# Patient Record
Sex: FEMALE | Race: WHITE | NOT HISPANIC OR LATINO | Employment: UNEMPLOYED | URBAN - METROPOLITAN AREA
[De-identification: names, ages, dates, MRNs, and addresses within clinical notes are randomized per-mention and may not be internally consistent; named-entity substitution may affect disease eponyms.]

---

## 2022-01-07 ENCOUNTER — OFFICE VISIT (OUTPATIENT)
Dept: UROLOGY | Facility: CLINIC | Age: 42
End: 2022-01-07
Payer: MEDICAID

## 2022-01-07 VITALS
BODY MASS INDEX: 40.88 KG/M2 | WEIGHT: 239.44 LBS | SYSTOLIC BLOOD PRESSURE: 128 MMHG | HEIGHT: 64 IN | DIASTOLIC BLOOD PRESSURE: 90 MMHG

## 2022-01-07 DIAGNOSIS — N39.46 MIXED INCONTINENCE: Primary | ICD-10-CM

## 2022-01-07 LAB
BILIRUB SERPL-MCNC: NORMAL MG/DL
BLOOD URINE, POC: NORMAL
CLARITY, POC UA: CLEAR
COLOR, POC UA: YELLOW
GLUCOSE UR QL STRIP: NORMAL
KETONES UR QL STRIP: NORMAL
LEUKOCYTE ESTERASE URINE, POC: NORMAL
NITRITE, POC UA: NORMAL
PH, POC UA: 5
PROTEIN, POC: NORMAL
SPECIFIC GRAVITY, POC UA: 1.01
UROBILINOGEN, POC UA: NORMAL

## 2022-01-07 PROCEDURE — 1159F PR MEDICATION LIST DOCUMENTED IN MEDICAL RECORD: ICD-10-PCS | Mod: CPTII,,, | Performed by: UROLOGY

## 2022-01-07 PROCEDURE — 81002 URINALYSIS NONAUTO W/O SCOPE: CPT | Mod: PBBFAC,PO | Performed by: UROLOGY

## 2022-01-07 PROCEDURE — 3008F PR BODY MASS INDEX (BMI) DOCUMENTED: ICD-10-PCS | Mod: CPTII,,, | Performed by: UROLOGY

## 2022-01-07 PROCEDURE — 99204 PR OFFICE/OUTPT VISIT, NEW, LEVL IV, 45-59 MIN: ICD-10-PCS | Mod: S$PBB,,, | Performed by: UROLOGY

## 2022-01-07 PROCEDURE — 3008F BODY MASS INDEX DOCD: CPT | Mod: CPTII,,, | Performed by: UROLOGY

## 2022-01-07 PROCEDURE — 1160F RVW MEDS BY RX/DR IN RCRD: CPT | Mod: CPTII,,, | Performed by: UROLOGY

## 2022-01-07 PROCEDURE — 99999 PR PBB SHADOW E&M-EST. PATIENT-LVL III: CPT | Mod: PBBFAC,,, | Performed by: UROLOGY

## 2022-01-07 PROCEDURE — 3080F PR MOST RECENT DIASTOLIC BLOOD PRESSURE >= 90 MM HG: ICD-10-PCS | Mod: CPTII,,, | Performed by: UROLOGY

## 2022-01-07 PROCEDURE — 1159F MED LIST DOCD IN RCRD: CPT | Mod: CPTII,,, | Performed by: UROLOGY

## 2022-01-07 PROCEDURE — 3074F SYST BP LT 130 MM HG: CPT | Mod: CPTII,,, | Performed by: UROLOGY

## 2022-01-07 PROCEDURE — 99213 OFFICE O/P EST LOW 20 MIN: CPT | Mod: PBBFAC,PO | Performed by: UROLOGY

## 2022-01-07 PROCEDURE — 99999 PR PBB SHADOW E&M-EST. PATIENT-LVL III: ICD-10-PCS | Mod: PBBFAC,,, | Performed by: UROLOGY

## 2022-01-07 PROCEDURE — 99204 OFFICE O/P NEW MOD 45 MIN: CPT | Mod: S$PBB,,, | Performed by: UROLOGY

## 2022-01-07 PROCEDURE — 1160F PR REVIEW ALL MEDS BY PRESCRIBER/CLIN PHARMACIST DOCUMENTED: ICD-10-PCS | Mod: CPTII,,, | Performed by: UROLOGY

## 2022-01-07 PROCEDURE — 3080F DIAST BP >= 90 MM HG: CPT | Mod: CPTII,,, | Performed by: UROLOGY

## 2022-01-07 PROCEDURE — 3074F PR MOST RECENT SYSTOLIC BLOOD PRESSURE < 130 MM HG: ICD-10-PCS | Mod: CPTII,,, | Performed by: UROLOGY

## 2022-01-07 PROCEDURE — 51798 US URINE CAPACITY MEASURE: CPT | Mod: PBBFAC,PO | Performed by: UROLOGY

## 2022-01-07 RX ORDER — OXYBUTYNIN CHLORIDE 10 MG/1
10 TABLET, EXTENDED RELEASE ORAL DAILY
Qty: 30 TABLET | Refills: 3 | Status: SHIPPED | OUTPATIENT
Start: 2022-01-07 | End: 2022-09-27 | Stop reason: ALTCHOICE

## 2022-01-07 NOTE — PATIENT INSTRUCTIONS
"Patient Education       Pelvic Muscle (Kegel) Exercises   The Basics   Written by the doctors and editors at [facility]   What are pelvic muscle exercises? -- Pelvic muscle exercises are exercises that strengthen the muscles that support the organs in the pelvis. These organs include the bladder and rectum. In the female pelvis, they also include the uterus (figure 1). The pelvic muscles are also called the "pelvic floor."  Pelvic muscle exercises are also known as "Kegel" exercises. They can help keep you from leaking urine, gas, or bowel movements, if leaks are a problem for you. They can also help with a condition called "pelvic organ prolapse." This is when the organs in the lower belly drop down and press against or bulge into the vagina.  How do I learn how to do pelvic muscle exercises? -- If you want to try pelvic muscle exercises, start by talking to your doctor or nurse. They can talk to you about whether these exercises can help you. They can also teach you how to do them correctly.  You will need to learn which muscles to tighten. It is sometimes hard to figure out the right muscles. Below are some ways you can practice:   · People with female or male anatomy - Squeeze the muscles you would use to avoid passing gas.  · People with female anatomy - Put a finger inside your vagina and squeeze the muscles around your finger. Or you can imagine that you are sitting on a marble and have to pick it up using your vagina.  · People with male anatomy - Squeeze the muscles that control the flow of urine. These exercises might help reduce urine leaks in people who have had surgery to treat prostate cancer or an enlarged prostate.  No matter how you learn to do pelvic muscle exercises, it's important to know is that the muscles involved are not in your belly, thighs, or buttocks.  After you learn which muscles to tighten, you can do the exercises in any position (standing, sitting, or lying down).  Should I see a " "physical therapist? -- Your doctor or nurse might suggest working with a physical therapist who has special training in pelvic floor issues. They can check your muscle strength and teach you specific exercises.  How often should I do the exercises? -- A common approach is to try to do a set of the exercises 3 times a day.  For each set, do the following about 10 times:  · Squeeze your pelvic muscles.  · Hold the muscles tight for about 10 seconds.   · Relax the muscles completely.  Keep up this routine for at least a few months. You will probably notice results, but it might take a few weeks to several months.   How do pelvic muscle exercises help? -- Pelvic muscle exercises can help:  · Prevent urine leaks in people who have "stress incontinence" - This means they leak urine when they cough, laugh, sneeze, or strain.  · Control sudden urges to urinate - These happen to people with "urinary urgency" or "urge incontinence."  · Control the release of gas or bowel movements  · Improve symptoms caused by pelvic organ prolapse - These can include pressure or bulging in the vagina. If you have these symptoms, it's important to see your doctor or nurse to find out the cause.  It might take a few months of doing the exercises regularly before you notice them working. If you have been doing pelvic muscle exercises for several months and they don't seem to be making a difference, tell your doctor or nurse. They might suggest seeing a physical therapist or trying other treatments for your condition.  All topics are updated as new evidence becomes available and our peer review process is complete.  This topic retrieved from [employer] on: Sep 21, 2021.  Topic 86694 Version 11.0  Release: 29.4.2 - C29.263  © 2021 UpToDate, Inc. and/or its affiliates. All rights reserved.  figure 1: Pelvic floor muscles     The "pelvic floor" is a group of muscles that support the organs in the pelvis. In women, these organs include the uterus, " bladder, and rectum.  Graphic 279375 Version 1.0    Consumer Information Use and Disclaimer   This information is not specific medical advice and does not replace information you receive from your health care provider. This is only a brief summary of general information. It does NOT include all information about conditions, illnesses, injuries, tests, procedures, treatments, therapies, discharge instructions or life-style choices that may apply to you. You must talk with your health care provider for complete information about your health and treatment options. This information should not be used to decide whether or not to accept your health care provider's advice, instructions or recommendations. Only your health care provider has the knowledge and training to provide advice that is right for you. The use of this information is governed by the [employer] End User License Agreement, available at https://[email or web address]/solutions/[email or web address]/about/henry.The use of [employer] content is governed by the [employer] Terms of Use. ©2021 UpToDate, Inc. All rights reserved.  Copyright   © 2021 UpToDate, Inc. and/or its affiliates. All rights reserved.

## 2022-01-07 NOTE — PROGRESS NOTES
"  Chief Complaint   Patient presents with    Other     New patient/ bladder leakage       Referring Provider: Dr. Barb Fleming      History of Present Illness:   Katelin Avina is a 41 y.o. female here for evaluation of Other (New patient/ bladder leakage)  22- Pt reports that she is . She reports having urinary urgency in the am, and has incontinence when she rotates to get out of bed to go to the bathroom. Also if she drinks water and tries to get up, she will leak. Has key in lock urgency/incontinence. States that her gyn a year ago wanted to do surgery, but now she is with a new gyn. She also has KAHLIL with cough/sneeze/laugh. KAHLIL=UUI. Pt reports feeling a rectal bulge and was told that she had a "rectal Hernia". New gyn didn't think it was bad enough for surgery. Wears 3 pads per day.   Hasn't tried any meds or kegels.   Oldest child is almost 2. Probably not having any more.         Review of Systems   Constitutional: Negative for chills and fever.   Respiratory: Negative for shortness of breath.    Cardiovascular: Negative for chest pain.   Gastrointestinal: Positive for constipation.   Genitourinary: Negative for dysuria and hematuria.   All other systems reviewed and are negative.      Past Medical History:   Diagnosis Date    Gestational diabetes mellitus in pregnancy        Past Surgical History:   Procedure Laterality Date    TONSILLECTOMY         Family History   Problem Relation Age of Onset    Diabetes Neg Hx     Hypertension Neg Hx        Social History     Tobacco Use    Smoking status: Never Smoker    Smokeless tobacco: Never Used   Substance Use Topics    Alcohol use: Yes     Comment: occ    Drug use: Never       Current Outpatient Medications   Medication Sig Dispense Refill    pantoprazole (PROTONIX) 40 MG tablet TAKE ONE TABLET BY MOUTH DAILY 30 MINUTES BEFORE BREAKFAST      oxybutynin (DITROPAN-XL) 10 MG 24 hr tablet Take 1 tablet (10 mg total) by mouth once daily. " 30 tablet 3     No current facility-administered medications for this visit.       Review of patient's allergies indicates:  No Known Allergies    Physical Exam  Vitals:    01/07/22 1140   BP: (!) 128/90     General: Well-developed, well-nourished, in no acute distress  HEENT: Normocephalic, atraumatic, extraocular movements intact  Neck: Supple, no supraclavicular or cervical lymphadenopathy, trachea midline  Respirations: even and unlabored  Back: midline spine, No CVA tenderness  Abdomen: soft, Non-tender, non-distended, no palpable masses, no rebound or guarding  : Normal external female genitalia without lesions. Orthotopic urethral meatus. healthy vaginal mucosa. Grade 1 Cysotcele, Grade 1 Rectocele, negative cough stress test, + urethral hypermobility  Extremities: moves all equally, no clubbing, cyanosis or edema  Skin: Warm and dry. No lesions  Psych: normal affect  Neuro: Alert and oriented x 3. Cranial nerves II-XII intact    PVR: 38cc    Urinalysis  Negative for blood, LE, nitrite    Assessment:  1. Mixed incontinence  POCT URINE DIPSTICK WITHOUT MICROSCOPE    POCT Bladder Scan         Plan:   Mixed incontinence  -     POCT URINE DIPSTICK WITHOUT MICROSCOPE  -     POCT Bladder Scan    Other orders  -     oxybutynin (DITROPAN-XL) 10 MG 24 hr tablet; Take 1 tablet (10 mg total) by mouth once daily.  Dispense: 30 tablet; Refill: 3      I had a detailed discussion with the patient today regarding management options for stress urinary incontinence. We discussed Kegel exercises, pelvic floor Physical Therapy, Mid-urethral sling, Autologous Rectus fascial sling and urethral bulking injections. Risks/benefits of each were discussed. We also discussed the bladder pathway for urge symptoms. Pt would like to start with OAB meds and  Kegels.       Follow up in about 3 months (around 4/7/2022).

## 2022-06-16 ENCOUNTER — TELEPHONE (OUTPATIENT)
Dept: INTERNAL MEDICINE | Facility: CLINIC | Age: 42
End: 2022-06-16
Payer: MEDICAID

## 2022-06-17 ENCOUNTER — OFFICE VISIT (OUTPATIENT)
Dept: PRIMARY CARE CLINIC | Facility: CLINIC | Age: 42
End: 2022-06-17
Payer: MEDICAID

## 2022-06-17 VITALS
WEIGHT: 237 LBS | DIASTOLIC BLOOD PRESSURE: 68 MMHG | SYSTOLIC BLOOD PRESSURE: 134 MMHG | HEART RATE: 81 BPM | OXYGEN SATURATION: 100 % | TEMPERATURE: 98 F | BODY MASS INDEX: 40.68 KG/M2 | RESPIRATION RATE: 18 BRPM

## 2022-06-17 DIAGNOSIS — R73.01 IMPAIRED FASTING GLUCOSE: ICD-10-CM

## 2022-06-17 DIAGNOSIS — E88.819 INSULIN RESISTANCE: Primary | Chronic | ICD-10-CM

## 2022-06-17 DIAGNOSIS — Z83.3 FAMILY HISTORY OF DIABETES MELLITUS: Chronic | ICD-10-CM

## 2022-06-17 DIAGNOSIS — E66.01 CLASS 3 SEVERE OBESITY WITH SERIOUS COMORBIDITY AND BODY MASS INDEX (BMI) OF 40.0 TO 44.9 IN ADULT, UNSPECIFIED OBESITY TYPE: ICD-10-CM

## 2022-06-17 DIAGNOSIS — Z86.32 HISTORY OF GESTATIONAL DIABETES: ICD-10-CM

## 2022-06-17 PROCEDURE — 99999 PR PBB SHADOW E&M-EST. PATIENT-LVL III: CPT | Mod: PBBFAC,,, | Performed by: FAMILY MEDICINE

## 2022-06-17 PROCEDURE — 99999 PR PBB SHADOW E&M-EST. PATIENT-LVL III: ICD-10-PCS | Mod: PBBFAC,,, | Performed by: FAMILY MEDICINE

## 2022-06-17 PROCEDURE — 3078F PR MOST RECENT DIASTOLIC BLOOD PRESSURE < 80 MM HG: ICD-10-PCS | Mod: CPTII,,, | Performed by: FAMILY MEDICINE

## 2022-06-17 PROCEDURE — 99204 OFFICE O/P NEW MOD 45 MIN: CPT | Mod: S$PBB,,, | Performed by: FAMILY MEDICINE

## 2022-06-17 PROCEDURE — 99213 OFFICE O/P EST LOW 20 MIN: CPT | Mod: PBBFAC | Performed by: FAMILY MEDICINE

## 2022-06-17 PROCEDURE — 3008F PR BODY MASS INDEX (BMI) DOCUMENTED: ICD-10-PCS | Mod: CPTII,,, | Performed by: FAMILY MEDICINE

## 2022-06-17 PROCEDURE — 99204 PR OFFICE/OUTPT VISIT, NEW, LEVL IV, 45-59 MIN: ICD-10-PCS | Mod: S$PBB,,, | Performed by: FAMILY MEDICINE

## 2022-06-17 PROCEDURE — 3075F PR MOST RECENT SYSTOLIC BLOOD PRESS GE 130-139MM HG: ICD-10-PCS | Mod: CPTII,,, | Performed by: FAMILY MEDICINE

## 2022-06-17 PROCEDURE — 1159F PR MEDICATION LIST DOCUMENTED IN MEDICAL RECORD: ICD-10-PCS | Mod: CPTII,,, | Performed by: FAMILY MEDICINE

## 2022-06-17 PROCEDURE — 1160F RVW MEDS BY RX/DR IN RCRD: CPT | Mod: CPTII,,, | Performed by: FAMILY MEDICINE

## 2022-06-17 PROCEDURE — 1159F MED LIST DOCD IN RCRD: CPT | Mod: CPTII,,, | Performed by: FAMILY MEDICINE

## 2022-06-17 PROCEDURE — 3008F BODY MASS INDEX DOCD: CPT | Mod: CPTII,,, | Performed by: FAMILY MEDICINE

## 2022-06-17 PROCEDURE — 1160F PR REVIEW ALL MEDS BY PRESCRIBER/CLIN PHARMACIST DOCUMENTED: ICD-10-PCS | Mod: CPTII,,, | Performed by: FAMILY MEDICINE

## 2022-06-17 PROCEDURE — 3075F SYST BP GE 130 - 139MM HG: CPT | Mod: CPTII,,, | Performed by: FAMILY MEDICINE

## 2022-06-17 PROCEDURE — 3078F DIAST BP <80 MM HG: CPT | Mod: CPTII,,, | Performed by: FAMILY MEDICINE

## 2022-06-17 RX ORDER — METFORMIN HYDROCHLORIDE 500 MG/1
500 TABLET, EXTENDED RELEASE ORAL 2 TIMES DAILY WITH MEALS
Qty: 60 TABLET | Refills: 3 | Status: SHIPPED | OUTPATIENT
Start: 2022-06-17 | End: 2023-09-28

## 2022-06-17 NOTE — PROGRESS NOTES
"Subjective:       Patient ID: Katelin Avina is a 41 y.o. female.  Pmhx, fam hx, soc hx, surg hx, allergies reviewed    Chief Complaint: No chief complaint on file.    Chief Complaint   Patient presents with    Weight Loss     PT PRESENTS TO CLINIC TO DISCUSS WEIGHT LOSS AND DIET PLAN.     Pt wants to discuss insulin resistance and possible weight loss  Has some brain fog/fatigue.    175 happy weight. From [place].   Tried phentermine; made snappy initially but otherwise tolerable.   Eats turkey meraz.   Sometimes eats fruit ropes.    Pt had IUD out; is aware metformin may make more fertile.   Otc supplements: consider vit D, b complex     4 year old and 2  Year old daughters. 5 kids.    Pt changed from Dr Garcia to Dr Fleming after having baby in 2020. Pt reports had hx of insulin resistance. History of gestational DM--was on am med (glyburide). No insulin. NO metformin.   A1c was 6.0 and did 2 hr glucose and had elevated 2 hour glucose. Was not able to do the study. Pt is checking fasting sugars periodically and have been 100-110.  Sugar was checked 2 hours post prandial 136. Tries low carb but states "fail on a daily basis." Sweets are a weakness; homemade products: cookies/brownies.     Eats greek yogurt/light and fit; some with flavor    uses ruben trivedi or natures own honey wheat and sandwich for lunch;turkey; occasional olive oil mcmullen  Tries to avoid chips, sliced cucumbers; salad at times with dinner  Evening meal: pasta (barilla protein pasta/chickpea) with lean ground beef or italian   Tends to eats   Raos pasta sauce.    Sometimes stays hungry; puts creamer (4 tbsp). Usually no hyperglycemia.     Was seeing Dr Vergara but changed to [facility] in [place].             Referring MD:  PCP: Dr Vergara  BMI noted 40  Diet: varied  Exercise/Activity:light  Sleep: daughter sleeps in middle; some occasional issues with sleep maintenance  Stressors: small child;  was out of work; got another " job  Anxiety/Depression Screen/PHQ-2:    HPI  Review of Systems   Constitutional: Negative for activity change, appetite change, fatigue and fever.   Eyes: Negative for visual disturbance.   Respiratory: Negative for apnea, cough, chest tightness and shortness of breath.    Cardiovascular: Negative for chest pain, palpitations and leg swelling.   Gastrointestinal: Negative for abdominal pain, constipation and diarrhea.   Endocrine: Negative for cold intolerance, heat intolerance, polydipsia, polyphagia and polyuria.   Genitourinary: Negative for frequency and menstrual problem.   Musculoskeletal: Negative for arthralgias and myalgias.   Integumentary:  Negative for color change and rash.   Psychiatric/Behavioral: Negative for sleep disturbance. The patient is not nervous/anxious.          Objective:      Physical Exam  Vitals and nursing note reviewed.   Constitutional:       General: She is not in acute distress.  HENT:      Head: Normocephalic and atraumatic.   Eyes:      General: No scleral icterus.     Pupils: Pupils are equal, round, and reactive to light.   Neck:      Comments: No TM  Cardiovascular:      Rate and Rhythm: Normal rate and regular rhythm.      Pulses: Normal pulses.      Heart sounds: Normal heart sounds. No murmur heard.    No friction rub. No gallop.   Pulmonary:      Effort: Pulmonary effort is normal. No respiratory distress.      Breath sounds: Normal breath sounds. No wheezing, rhonchi or rales.   Abdominal:      General: Bowel sounds are normal. There is no distension.      Palpations: Abdomen is soft.      Tenderness: There is no abdominal tenderness.   Musculoskeletal:         General: No swelling.      Cervical back: Normal range of motion and neck supple. No tenderness.   Lymphadenopathy:      Cervical: No cervical adenopathy.   Skin:     General: Skin is warm.      Findings: No erythema or rash.   Neurological:      Mental Status: She is alert and oriented to person, place, and time.    Psychiatric:         Mood and Affect: Mood normal.         Behavior: Behavior normal.         Assessment:                1. Impaired fasting glucose    2. Insulin resistance    3. Class 3 severe obesity with serious comorbidity and body mass index (BMI) of 40.0 to 44.9 in adult, unspecified obesity type        Plan:       Insulin resistance  Impaired fasting glucose  -     metFORMIN (GLUCOPHAGE-XR) 500 MG ER 24hr tablet; Take 1 tablet (500 mg total) by mouth 2 (two) times daily with meals.  Dispense: 60 tablet; Refill: 3    Class 3 severe obesity with serious comorbidity and body mass index (BMI) of 40.0 to 44.9 in adult, unspecified obesity type  -     metFORMIN (GLUCOPHAGE-XR) 500 MG ER 24hr tablet; Take 1 tablet (500 mg total) by mouth 2 (two) times daily with meals.  Dispense: 60 tablet; Refill: 3  D/w pt need to initiate therapy; can check sugar at home  rx metformin; utd handout given  Start with 1 pill once daily of cr; return to clinic 4 weeks; send update; likely will up titrate  If gi upset then will try to get ozempic approved; pt is still candidate for that as well  Historically tried phentermine but is not first line therapy now  Did d/w pt since had IUD out that metformin often improves insulin sens and therefore fertility--needs to have contraception if does not desire pregnancy  Goal weight loss 5-10% body weight  Pt does not want to count calories   Discussed the role of diet  Hx of elevated lipids/trip; will request labs from Dr Fleming's office--left message.      History of gestational diabetes  Family history of diabetes mellitus    > 45 min spent with patient; nutritional counseling given  Med diet; she will send a food log  Work on limiting added sugars to no more than 20 grams/day  Pt does not want to have restrictive meal plan but is willing to make some small changes

## 2022-06-17 NOTE — PATIENT INSTRUCTIONS
Please send me your food log next week.   Work on no more than 20 grams added sugar daily  I will give you the eat fit handout.  I have called out the metformin and given you an up to date handout.

## 2022-07-28 ENCOUNTER — PATIENT MESSAGE (OUTPATIENT)
Dept: PRIMARY CARE CLINIC | Facility: CLINIC | Age: 42
End: 2022-07-28
Payer: MEDICAID

## 2022-07-28 ENCOUNTER — PATIENT MESSAGE (OUTPATIENT)
Dept: UROLOGY | Facility: CLINIC | Age: 42
End: 2022-07-28
Payer: MEDICAID

## 2022-09-27 ENCOUNTER — PATIENT MESSAGE (OUTPATIENT)
Dept: PRIMARY CARE CLINIC | Facility: CLINIC | Age: 42
End: 2022-09-27

## 2022-09-27 ENCOUNTER — OFFICE VISIT (OUTPATIENT)
Dept: PRIMARY CARE CLINIC | Facility: CLINIC | Age: 42
End: 2022-09-27
Payer: MEDICAID

## 2022-09-27 DIAGNOSIS — E66.01 CLASS 3 SEVERE OBESITY WITH SERIOUS COMORBIDITY AND BODY MASS INDEX (BMI) OF 40.0 TO 44.9 IN ADULT, UNSPECIFIED OBESITY TYPE: ICD-10-CM

## 2022-09-27 DIAGNOSIS — R73.01 IMPAIRED FASTING GLUCOSE: ICD-10-CM

## 2022-09-27 DIAGNOSIS — E88.819 INSULIN RESISTANCE: ICD-10-CM

## 2022-09-27 DIAGNOSIS — Z83.3 FAMILY HISTORY OF DIABETES MELLITUS: Chronic | ICD-10-CM

## 2022-09-27 DIAGNOSIS — E88.810 METABOLIC SYNDROME: Primary | ICD-10-CM

## 2022-09-27 PROCEDURE — 1159F PR MEDICATION LIST DOCUMENTED IN MEDICAL RECORD: ICD-10-PCS | Mod: CPTII,95,, | Performed by: FAMILY MEDICINE

## 2022-09-27 PROCEDURE — 99214 PR OFFICE/OUTPT VISIT, EST, LEVL IV, 30-39 MIN: ICD-10-PCS | Mod: 95,,, | Performed by: FAMILY MEDICINE

## 2022-09-27 PROCEDURE — 99214 OFFICE O/P EST MOD 30 MIN: CPT | Mod: 95,,, | Performed by: FAMILY MEDICINE

## 2022-09-27 PROCEDURE — 1160F PR REVIEW ALL MEDS BY PRESCRIBER/CLIN PHARMACIST DOCUMENTED: ICD-10-PCS | Mod: CPTII,95,, | Performed by: FAMILY MEDICINE

## 2022-09-27 PROCEDURE — 1159F MED LIST DOCD IN RCRD: CPT | Mod: CPTII,95,, | Performed by: FAMILY MEDICINE

## 2022-09-27 PROCEDURE — 1160F RVW MEDS BY RX/DR IN RCRD: CPT | Mod: CPTII,95,, | Performed by: FAMILY MEDICINE

## 2022-09-27 RX ORDER — SEMAGLUTIDE 1.34 MG/ML
INJECTION, SOLUTION SUBCUTANEOUS
Qty: 1 PEN | Refills: 1 | Status: SHIPPED | OUTPATIENT
Start: 2022-09-27 | End: 2023-01-27

## 2022-09-27 NOTE — PROGRESS NOTES
Subjective:       Patient ID: Katelin Avina is a 42 y.o. female.  Pmhx, fam hx, soc hx, surg hx, allergies, med list reviewed     Chief Complaint: f/u insulin resistance/metformin/metabolic syndrome  The [place] is: [place]  The chief complaint leading to consultation is: as above    Visit type: audiovisual    Face to Face time with patient: 25  35 minutes of total time spent on the encounter, which includes face to face time and non-face to face time preparing to see the patient (eg, review of tests), Obtaining and/or reviewing separately obtained history, Documenting clinical information in the electronic or other health record, Independently interpreting results (not separately reported) and communicating results to the patient/family/caregiver, or Care coordination (not separately reported).         Each patient to whom he or she provides medical services by telemedicine is:  (1) informed of the relationship between the physician and patient and the respective role of any other health care provider with respect to management of the patient; and (2) notified that he or she may decline to receive medical services by telemedicine and may withdraw from such care at any time.    Notes:    Hx insulin resistance, on metformin. DW pt lifestyle changes at last visit. Meets criteria for met syndrome (waist circ, insulin resistance, trig/hdl). She reports had lost 7 lb but went to beach for labor day and gained 5 lb back. Has been trying to get back on track for the last 2-3 weeks. No new foods tried but has been incorporating more veggies and water.   Plans to start walking since weather cooled off. Is due for some labs. NO new questions. Agreeable to nutrition referral.     No cp/sob. No hx of thyroid issues/nodules. NO throat pain.     Did find out had covid on the day she had her appt her. Has recovered. Reports doing well.      HPI  Review of Systems   Constitutional:  Negative for activity change,  appetite change, fatigue and fever.   HENT:  Negative for mouth dryness and goiter.    Eyes:  Negative for visual disturbance.   Respiratory:  Negative for apnea, cough, chest tightness and shortness of breath.    Cardiovascular:  Negative for chest pain, palpitations and leg swelling.   Gastrointestinal:  Negative for abdominal pain, constipation and diarrhea.   Endocrine: Negative for cold intolerance, heat intolerance, polydipsia, polyphagia and polyuria.   Genitourinary:  Negative for frequency and menstrual problem.   Musculoskeletal:  Negative for arthralgias and myalgias.   Integumentary:  Negative for color change and rash.   Psychiatric/Behavioral:  Negative for sleep disturbance. The patient is not nervous/anxious.        Objective:      Physical Exam  Vitals and nursing note reviewed.   Constitutional:       General: She is not in acute distress.  HENT:      Head: Normocephalic and atraumatic.      Mouth/Throat:      Pharynx: Oropharynx is clear.   Eyes:      General: No scleral icterus.     Pupils: Pupils are equal, round, and reactive to light.   Neck:      Comments: No TM  Cardiovascular:      Rate and Rhythm: Normal rate and regular rhythm.      Pulses: Normal pulses.      Heart sounds: Normal heart sounds. No murmur heard.    No friction rub. No gallop.   Pulmonary:      Effort: Pulmonary effort is normal. No respiratory distress.      Breath sounds: Normal breath sounds. No wheezing, rhonchi or rales.   Abdominal:      General: Bowel sounds are normal. There is no distension.      Palpations: Abdomen is soft.      Tenderness: There is no abdominal tenderness.   Musculoskeletal:         General: No swelling.      Cervical back: Normal range of motion and neck supple. No tenderness.   Lymphadenopathy:      Cervical: No cervical adenopathy.   Skin:     General: Skin is warm.      Findings: No erythema or rash.   Neurological:      Mental Status: She is alert and oriented to person, place, and time.    Psychiatric:         Mood and Affect: Mood normal.         Behavior: Behavior normal.       Assessment:         ICD-10-CM ICD-9-CM   1. Metabolic syndrome  E88.81 277.7   2. Insulin resistance  E88.81 277.7   3. Impaired fasting glucose  R73.01 790.21   4. Class 3 severe obesity with serious comorbidity and body mass index (BMI) of 40.0 to 44.9 in adult, unspecified obesity type  E66.01 278.01    Z68.41 V85.41   5. Family history of diabetes mellitus  Z83.3 V18.0             Plan:       Metabolic syndrome  Comments:  3/5 criteria: trig/lip, waist circ (self reported >40), insulin resistance/IFG)  Orders:  -     semaglutide (OZEMPIC) 0.25 mg or 0.5 mg(2 mg/1.5 mL) pen injector; Inject 0.25 mg subq weekly x 2 weeks then 0.5 mg subq weekly  Dispense: 1 pen; Refill: 1  -     Comprehensive Metabolic Panel; Future; Expected date: 09/27/2022  -     Hemoglobin A1C; Future; Expected date: 09/27/2022  -     Insulin, Random; Future; Expected date: 09/27/2022  -     Lipid Panel; Future; Expected date: 09/27/2022  -     TSH; Future; Expected date: 09/27/2022  Referral Nutrition     Insulin resistance  -     semaglutide (OZEMPIC) 0.25 mg or 0.5 mg(2 mg/1.5 mL) pen injector; Inject 0.25 mg subq weekly x 2 weeks then 0.5 mg subq weekly  Dispense: 1 pen; Refill: 1  -     Comprehensive Metabolic Panel; Future; Expected date: 09/27/2022  -     Hemoglobin A1C; Future; Expected date: 09/27/2022  -     Insulin, Random; Future; Expected date: 09/27/2022  -     Lipid Panel; Future; Expected date: 09/27/2022  -     TSH; Future; Expected date: 09/27/2022    Impaired fasting glucose  -     Hemoglobin A1C; Future; Expected date: 09/27/2022    Class 3 severe obesity with serious comorbidity and body mass index (BMI) of 40.0 to 44.9 in adult, unspecified obesity type  -     semaglutide (OZEMPIC) 0.25 mg or 0.5 mg(2 mg/1.5 mL) pen injector; Inject 0.25 mg subq weekly x 2 weeks then 0.5 mg subq weekly  Dispense: 1 pen; Refill: 1  -      Comprehensive Metabolic Panel; Future; Expected date: 09/27/2022  -     Hemoglobin A1C; Future; Expected date: 09/27/2022  -     Insulin, Random; Future; Expected date: 09/27/2022  -     TSH; Future; Expected date: 09/27/2022  Referral nutrition    Family history of diabetes mellitus  -     Hemoglobin A1C; Future; Expected date: 09/27/2022    Continued efforts lifestyle interventions d/w pt  Some additional nutrition counseling given; return to clinic 4-6 weeks  Pt to send priya update next week; she will let me know what insurance says about glp-1 coverage  If not--is overall tolerating metformin bid can increase dosage.       Risks/benefits/common side effects of medication discussed with patient at length. UTD patient handout given. (mychart msg)  D/W pt at length potential pharmacologic options; she desires consideration of ozempic. Risks/benefits/common side effects of ozempic d/w pt including nausea and pain at injection site; she is aware should not get pregnant while taking and not approved while breastfeeding. NO personal/fam hx of MEN or medullary thyroid cancer. No hx of pancreatitis. Instructed pt how to use with demo pen; pt practiced in office. Pt advised if approved will get pt handout from pharmacy. Pt has no hx of thyroid nodules or biliary disease/gallstones. DW pt with substantial or rapid weight loss gallstones could develop. Also dw pt glp-1 MOA and common side effects.    Pt expressed undestanding; will need to use some form of contraception

## 2022-09-29 ENCOUNTER — PATIENT MESSAGE (OUTPATIENT)
Dept: PRIMARY CARE CLINIC | Facility: CLINIC | Age: 42
End: 2022-09-29
Payer: MEDICAID

## 2022-09-29 ENCOUNTER — LAB VISIT (OUTPATIENT)
Dept: LAB | Facility: HOSPITAL | Age: 42
End: 2022-09-29
Attending: FAMILY MEDICINE
Payer: MEDICAID

## 2022-09-29 DIAGNOSIS — E88.819 INSULIN RESISTANCE: ICD-10-CM

## 2022-09-29 DIAGNOSIS — Z83.3 FAMILY HISTORY OF DIABETES MELLITUS: Chronic | ICD-10-CM

## 2022-09-29 DIAGNOSIS — R73.01 IMPAIRED FASTING GLUCOSE: ICD-10-CM

## 2022-09-29 DIAGNOSIS — E66.01 CLASS 3 SEVERE OBESITY WITH SERIOUS COMORBIDITY AND BODY MASS INDEX (BMI) OF 40.0 TO 44.9 IN ADULT, UNSPECIFIED OBESITY TYPE: ICD-10-CM

## 2022-09-29 DIAGNOSIS — E88.810 METABOLIC SYNDROME: ICD-10-CM

## 2022-09-29 LAB
ALBUMIN SERPL BCP-MCNC: 4.1 G/DL (ref 3.5–5.2)
ALP SERPL-CCNC: 77 U/L (ref 55–135)
ALT SERPL W/O P-5'-P-CCNC: 35 U/L (ref 10–44)
ANION GAP SERPL CALC-SCNC: 6 MMOL/L (ref 8–16)
AST SERPL-CCNC: 26 U/L (ref 10–40)
BILIRUB SERPL-MCNC: 0.4 MG/DL (ref 0.1–1)
BUN SERPL-MCNC: 10 MG/DL (ref 6–20)
CALCIUM SERPL-MCNC: 9.8 MG/DL (ref 8.7–10.5)
CHLORIDE SERPL-SCNC: 105 MMOL/L (ref 95–110)
CHOLEST SERPL-MCNC: 158 MG/DL (ref 120–199)
CHOLEST/HDLC SERPL: 5.4 {RATIO} (ref 2–5)
CO2 SERPL-SCNC: 26 MMOL/L (ref 23–29)
CREAT SERPL-MCNC: 0.8 MG/DL (ref 0.5–1.4)
EST. GFR  (NO RACE VARIABLE): >60 ML/MIN/1.73 M^2
ESTIMATED AVG GLUCOSE: 111 MG/DL (ref 68–131)
GLUCOSE SERPL-MCNC: 111 MG/DL (ref 70–110)
HBA1C MFR BLD: 5.5 % (ref 4–5.6)
HDLC SERPL-MCNC: 29 MG/DL (ref 40–75)
HDLC SERPL: 18.4 % (ref 20–50)
INSULIN COLLECTION INTERVAL: NORMAL
INSULIN SERPL-ACNC: 14.2 UU/ML
LDLC SERPL CALC-MCNC: 87.6 MG/DL (ref 63–159)
NONHDLC SERPL-MCNC: 129 MG/DL
POTASSIUM SERPL-SCNC: 4.5 MMOL/L (ref 3.5–5.1)
PROT SERPL-MCNC: 7.2 G/DL (ref 6–8.4)
SODIUM SERPL-SCNC: 137 MMOL/L (ref 136–145)
TRIGL SERPL-MCNC: 207 MG/DL (ref 30–150)
TSH SERPL DL<=0.005 MIU/L-ACNC: 1.35 UIU/ML (ref 0.4–4)

## 2022-09-29 PROCEDURE — 84443 ASSAY THYROID STIM HORMONE: CPT | Performed by: FAMILY MEDICINE

## 2022-09-29 PROCEDURE — 80053 COMPREHEN METABOLIC PANEL: CPT | Performed by: FAMILY MEDICINE

## 2022-09-29 PROCEDURE — 36415 COLL VENOUS BLD VENIPUNCTURE: CPT | Performed by: FAMILY MEDICINE

## 2022-09-29 PROCEDURE — 80061 LIPID PANEL: CPT | Performed by: FAMILY MEDICINE

## 2022-09-29 PROCEDURE — 83036 HEMOGLOBIN GLYCOSYLATED A1C: CPT | Performed by: FAMILY MEDICINE

## 2022-09-29 PROCEDURE — 83525 ASSAY OF INSULIN: CPT | Performed by: FAMILY MEDICINE

## 2022-10-13 ENCOUNTER — PATIENT MESSAGE (OUTPATIENT)
Dept: PRIMARY CARE CLINIC | Facility: CLINIC | Age: 42
End: 2022-10-13
Payer: MEDICAID

## 2022-10-21 DIAGNOSIS — Z83.3 FAMILY HISTORY OF DIABETES MELLITUS: ICD-10-CM

## 2022-10-21 DIAGNOSIS — E66.01 CLASS 3 SEVERE OBESITY WITH SERIOUS COMORBIDITY AND BODY MASS INDEX (BMI) OF 40.0 TO 44.9 IN ADULT, UNSPECIFIED OBESITY TYPE: ICD-10-CM

## 2022-10-21 DIAGNOSIS — E88.810 METABOLIC SYNDROME: ICD-10-CM

## 2022-10-21 DIAGNOSIS — E88.819 INSULIN RESISTANCE: Primary | ICD-10-CM

## 2022-10-21 RX ORDER — SEMAGLUTIDE 1.34 MG/ML
1 INJECTION, SOLUTION SUBCUTANEOUS
Qty: 1 PEN | Refills: 2 | Status: SHIPPED | OUTPATIENT
Start: 2022-10-21 | End: 2023-01-03 | Stop reason: SDUPTHER

## 2022-11-20 ENCOUNTER — PATIENT MESSAGE (OUTPATIENT)
Dept: PRIMARY CARE CLINIC | Facility: CLINIC | Age: 42
End: 2022-11-20
Payer: MEDICAID

## 2023-01-02 ENCOUNTER — PATIENT MESSAGE (OUTPATIENT)
Dept: PRIMARY CARE CLINIC | Facility: CLINIC | Age: 43
End: 2023-01-02
Payer: MEDICAID

## 2023-01-02 DIAGNOSIS — Z83.3 FAMILY HISTORY OF DIABETES MELLITUS: ICD-10-CM

## 2023-01-02 DIAGNOSIS — E88.819 INSULIN RESISTANCE: ICD-10-CM

## 2023-01-02 DIAGNOSIS — E66.01 CLASS 3 SEVERE OBESITY WITH SERIOUS COMORBIDITY AND BODY MASS INDEX (BMI) OF 40.0 TO 44.9 IN ADULT, UNSPECIFIED OBESITY TYPE: ICD-10-CM

## 2023-01-02 DIAGNOSIS — E88.810 METABOLIC SYNDROME: ICD-10-CM

## 2023-01-03 ENCOUNTER — PATIENT MESSAGE (OUTPATIENT)
Dept: PRIMARY CARE CLINIC | Facility: CLINIC | Age: 43
End: 2023-01-03
Payer: MEDICAID

## 2023-01-03 RX ORDER — SEMAGLUTIDE 1.34 MG/ML
1 INJECTION, SOLUTION SUBCUTANEOUS
Qty: 1 PEN | Refills: 2 | Status: SHIPPED | OUTPATIENT
Start: 2023-01-03 | End: 2023-01-27

## 2023-01-26 ENCOUNTER — PATIENT MESSAGE (OUTPATIENT)
Dept: PRIMARY CARE CLINIC | Facility: CLINIC | Age: 43
End: 2023-01-26
Payer: MEDICAID

## 2023-01-26 DIAGNOSIS — E66.01 CLASS 3 SEVERE OBESITY WITH SERIOUS COMORBIDITY AND BODY MASS INDEX (BMI) OF 40.0 TO 44.9 IN ADULT, UNSPECIFIED OBESITY TYPE: ICD-10-CM

## 2023-01-26 DIAGNOSIS — E88.810 METABOLIC SYNDROME: Primary | ICD-10-CM

## 2023-01-26 DIAGNOSIS — Z83.3 FAMILY HISTORY OF DIABETES MELLITUS: ICD-10-CM

## 2023-01-26 DIAGNOSIS — E88.819 INSULIN RESISTANCE: ICD-10-CM

## 2023-01-26 DIAGNOSIS — R73.01 IMPAIRED FASTING GLUCOSE: ICD-10-CM

## 2023-01-27 ENCOUNTER — PATIENT MESSAGE (OUTPATIENT)
Dept: PRIMARY CARE CLINIC | Facility: CLINIC | Age: 43
End: 2023-01-27
Payer: MEDICAID

## 2023-02-14 ENCOUNTER — OFFICE VISIT (OUTPATIENT)
Dept: PRIMARY CARE CLINIC | Facility: CLINIC | Age: 43
End: 2023-02-14
Payer: MEDICAID

## 2023-02-14 VITALS
HEIGHT: 64 IN | DIASTOLIC BLOOD PRESSURE: 80 MMHG | HEART RATE: 67 BPM | WEIGHT: 199.31 LBS | SYSTOLIC BLOOD PRESSURE: 124 MMHG | TEMPERATURE: 99 F | OXYGEN SATURATION: 98 % | BODY MASS INDEX: 34.03 KG/M2

## 2023-02-14 DIAGNOSIS — Z83.3 FAMILY HISTORY OF DIABETES MELLITUS: Chronic | ICD-10-CM

## 2023-02-14 DIAGNOSIS — Z86.32 HISTORY OF GESTATIONAL DIABETES: ICD-10-CM

## 2023-02-14 DIAGNOSIS — E66.01 CLASS 3 SEVERE OBESITY WITH SERIOUS COMORBIDITY AND BODY MASS INDEX (BMI) OF 40.0 TO 44.9 IN ADULT, UNSPECIFIED OBESITY TYPE: ICD-10-CM

## 2023-02-14 DIAGNOSIS — E88.810 METABOLIC SYNDROME: ICD-10-CM

## 2023-02-14 DIAGNOSIS — E66.09 CLASS 1 OBESITY DUE TO EXCESS CALORIES WITH SERIOUS COMORBIDITY AND BODY MASS INDEX (BMI) OF 34.0 TO 34.9 IN ADULT: ICD-10-CM

## 2023-02-14 DIAGNOSIS — E88.819 INSULIN RESISTANCE: Primary | ICD-10-CM

## 2023-02-14 PROBLEM — E66.2 CLASS 1 OBESITY WITH ALVEOLAR HYPOVENTILATION, SERIOUS COMORBIDITY, AND BODY MASS INDEX (BMI) OF 34.0 TO 34.9 IN ADULT: Status: ACTIVE | Noted: 2022-06-17

## 2023-02-14 PROCEDURE — 3074F SYST BP LT 130 MM HG: CPT | Mod: CPTII,,, | Performed by: FAMILY MEDICINE

## 2023-02-14 PROCEDURE — 3079F DIAST BP 80-89 MM HG: CPT | Mod: CPTII,,, | Performed by: FAMILY MEDICINE

## 2023-02-14 PROCEDURE — 3008F PR BODY MASS INDEX (BMI) DOCUMENTED: ICD-10-PCS | Mod: CPTII,,, | Performed by: FAMILY MEDICINE

## 2023-02-14 PROCEDURE — 99214 OFFICE O/P EST MOD 30 MIN: CPT | Mod: S$PBB,,, | Performed by: FAMILY MEDICINE

## 2023-02-14 PROCEDURE — 3079F PR MOST RECENT DIASTOLIC BLOOD PRESSURE 80-89 MM HG: ICD-10-PCS | Mod: CPTII,,, | Performed by: FAMILY MEDICINE

## 2023-02-14 PROCEDURE — 1159F MED LIST DOCD IN RCRD: CPT | Mod: CPTII,,, | Performed by: FAMILY MEDICINE

## 2023-02-14 PROCEDURE — 3008F BODY MASS INDEX DOCD: CPT | Mod: CPTII,,, | Performed by: FAMILY MEDICINE

## 2023-02-14 PROCEDURE — 3074F PR MOST RECENT SYSTOLIC BLOOD PRESSURE < 130 MM HG: ICD-10-PCS | Mod: CPTII,,, | Performed by: FAMILY MEDICINE

## 2023-02-14 PROCEDURE — 99214 PR OFFICE/OUTPT VISIT, EST, LEVL IV, 30-39 MIN: ICD-10-PCS | Mod: S$PBB,,, | Performed by: FAMILY MEDICINE

## 2023-02-14 PROCEDURE — 1159F PR MEDICATION LIST DOCUMENTED IN MEDICAL RECORD: ICD-10-PCS | Mod: CPTII,,, | Performed by: FAMILY MEDICINE

## 2023-02-14 PROCEDURE — 99999 PR PBB SHADOW E&M-EST. PATIENT-LVL III: CPT | Mod: PBBFAC,,, | Performed by: FAMILY MEDICINE

## 2023-02-14 PROCEDURE — 1160F PR REVIEW ALL MEDS BY PRESCRIBER/CLIN PHARMACIST DOCUMENTED: ICD-10-PCS | Mod: CPTII,,, | Performed by: FAMILY MEDICINE

## 2023-02-14 PROCEDURE — 99213 OFFICE O/P EST LOW 20 MIN: CPT | Mod: PBBFAC | Performed by: FAMILY MEDICINE

## 2023-02-14 PROCEDURE — 99999 PR PBB SHADOW E&M-EST. PATIENT-LVL III: ICD-10-PCS | Mod: PBBFAC,,, | Performed by: FAMILY MEDICINE

## 2023-02-14 PROCEDURE — 1160F RVW MEDS BY RX/DR IN RCRD: CPT | Mod: CPTII,,, | Performed by: FAMILY MEDICINE

## 2023-02-14 NOTE — PROGRESS NOTES
Subjective:       Patient ID: Katelin Avina is a 42 y.o. female.  Pmhx, fam hx, soc hx, surg hx, allergies, med list reviewed     Wt Readings from Last 3 Encounters:   02/14/23 1000 90.4 kg (199 lb 4.7 oz)   01/16/23 1321 93 kg (205 lb)   06/17/22 1105 107.5 kg (236 lb 15.9 oz)      Chief Complaint: insulin resistance    Pt reports has been doing well overall. Pt saw Dr. Fleming for a possible tubal.  will now likely do vasectomy. Pt currently using condoms. She has been careful. Cycles still similar--every 40 days.     Pt has lost 36 lb.     She started the 2 mg dosage and has been tolerating. No gi upset. NO constipation. NO neck/throat pain. Is on probiotic. Uses miralax prn only. Stopped taking the metformin a month into her medication.     She has been checking sugars periodically. She is feeling significantly better--no new headaches or fogginess in the afternoon.     Initially she ate less and has (over the last few months) been more selective with meds. She has cut down on sugars.      has also lost 11 lb.     Sleep is good. She plans to walk 5-10 min/day prior to going to [place].          HPI  Review of Systems   Constitutional:  Negative for activity change, appetite change, fatigue and fever.   HENT:  Negative for mouth dryness and goiter.    Eyes:  Negative for visual disturbance.   Respiratory:  Negative for apnea, cough, chest tightness and shortness of breath.    Cardiovascular:  Negative for chest pain, palpitations and leg swelling.   Gastrointestinal:  Negative for abdominal pain, constipation and diarrhea.   Endocrine: Negative for cold intolerance, heat intolerance, polydipsia, polyphagia and polyuria.   Genitourinary:  Negative for frequency and menstrual problem.   Musculoskeletal:  Negative for arthralgias and myalgias.   Integumentary:  Negative for color change and rash.   Psychiatric/Behavioral:  Negative for sleep disturbance. The patient is not nervous/anxious.         Objective:      Physical Exam  Vitals and nursing note reviewed.   Constitutional:       General: She is not in acute distress.  HENT:      Head: Normocephalic and atraumatic.   Eyes:      General: No scleral icterus.     Pupils: Pupils are equal, round, and reactive to light.   Neck:      Comments: No TM  Cardiovascular:      Rate and Rhythm: Normal rate and regular rhythm.      Pulses: Normal pulses.      Heart sounds: Normal heart sounds. No murmur heard.    No friction rub. No gallop.   Pulmonary:      Effort: Pulmonary effort is normal. No respiratory distress.      Breath sounds: Normal breath sounds. No wheezing, rhonchi or rales.   Abdominal:      General: Bowel sounds are normal. There is no distension.      Palpations: Abdomen is soft.      Tenderness: There is no abdominal tenderness.   Musculoskeletal:         General: No swelling.      Cervical back: Normal range of motion and neck supple. No tenderness.   Lymphadenopathy:      Cervical: No cervical adenopathy.   Skin:     General: Skin is warm.      Findings: No erythema or rash.   Neurological:      Mental Status: She is alert and oriented to person, place, and time.   Psychiatric:         Mood and Affect: Mood normal.         Behavior: Behavior normal.       Assessment:         ICD-10-CM ICD-9-CM   1. Insulin resistance  E88.81 277.7   2. Family history of diabetes mellitus  Z83.3 V18.0   3. History of gestational diabetes  Z86.32 V12.21   4. Class 1 obesity due to excess calories with serious comorbidity and body mass index (BMI) of 34.0 to 34.9 in adult  E66.09 278.00    Z68.34 V85.34   5. Metabolic syndrome  E88.81 277.7   6. Class 3 severe obesity with serious comorbidity and body mass index (BMI) of 40.0 to 44.9 in adult, unspecified obesity type  E66.01 278.01    Z68.41 V85.41   7. Impaired fasting glucose  R73.01 790.21            Plan:       Insulin resistance  -     semaglutide (OZEMPIC) 2 mg/dose (8 mg/3 mL) PnIj; Inject 2 mg into the  skin every 7 days.  Dispense: 1 pen; Refill: 1    Family history of diabetes mellitus  -     semaglutide (OZEMPIC) 2 mg/dose (8 mg/3 mL) PnIj; Inject 2 mg into the skin every 7 days.  Dispense: 1 pen; Refill: 1    History of gestational diabetes    Class 1 obesity due to excess calories with serious comorbidity and body mass index (BMI) of 34.0 to 34.9 in adult  -     semaglutide (OZEMPIC) 2 mg/dose (8 mg/3 mL) PnIj; Inject 2 mg into the skin every 7 days.  Dispense: 1 pen; Refill: 1    Metabolic syndrome  -     semaglutide (OZEMPIC) 2 mg/dose (8 mg/3 mL) PnIj; Inject 2 mg into the skin every 7 days.  Dispense: 1 pen; Refill: 1    Class 3 severe obesity with serious comorbidity and body mass index (BMI) of 40.0 to 44.9 in adult, unspecified obesity type    Dw pt about risks/benefits/se's of meds  She is aware should not get pregnant  Doing quite well with lifestyle interventions  Next goal: make sure getting enough water, start walking  BMI decrease from class 3 to 1  If insurance will not cover after march, may consider [employer])  Goal weight 170.

## 2023-03-27 ENCOUNTER — OFFICE VISIT (OUTPATIENT)
Dept: PRIMARY CARE CLINIC | Facility: CLINIC | Age: 43
End: 2023-03-27
Payer: MEDICAID

## 2023-03-27 VITALS
OXYGEN SATURATION: 99 % | HEART RATE: 82 BPM | WEIGHT: 192 LBS | DIASTOLIC BLOOD PRESSURE: 74 MMHG | TEMPERATURE: 98 F | SYSTOLIC BLOOD PRESSURE: 120 MMHG | BODY MASS INDEX: 32.78 KG/M2 | HEIGHT: 64 IN

## 2023-03-27 DIAGNOSIS — L65.9 HAIR LOSS: ICD-10-CM

## 2023-03-27 DIAGNOSIS — E88.810 METABOLIC SYNDROME: Primary | ICD-10-CM

## 2023-03-27 DIAGNOSIS — E66.09 CLASS 1 OBESITY DUE TO EXCESS CALORIES WITH SERIOUS COMORBIDITY AND BODY MASS INDEX (BMI) OF 34.0 TO 34.9 IN ADULT: ICD-10-CM

## 2023-03-27 DIAGNOSIS — R73.01 IMPAIRED FASTING GLUCOSE: ICD-10-CM

## 2023-03-27 DIAGNOSIS — K21.9 GASTROESOPHAGEAL REFLUX DISEASE WITHOUT ESOPHAGITIS: ICD-10-CM

## 2023-03-27 DIAGNOSIS — Z83.3 FAMILY HISTORY OF DIABETES MELLITUS: Chronic | ICD-10-CM

## 2023-03-27 DIAGNOSIS — E88.810 METABOLIC SYNDROME: ICD-10-CM

## 2023-03-27 DIAGNOSIS — E88.819 INSULIN RESISTANCE: ICD-10-CM

## 2023-03-27 PROCEDURE — 99999 PR PBB SHADOW E&M-EST. PATIENT-LVL III: ICD-10-PCS | Mod: PBBFAC,,, | Performed by: FAMILY MEDICINE

## 2023-03-27 PROCEDURE — 99213 OFFICE O/P EST LOW 20 MIN: CPT | Mod: PBBFAC | Performed by: FAMILY MEDICINE

## 2023-03-27 PROCEDURE — 99214 OFFICE O/P EST MOD 30 MIN: CPT | Mod: S$PBB,,, | Performed by: FAMILY MEDICINE

## 2023-03-27 PROCEDURE — 1159F MED LIST DOCD IN RCRD: CPT | Mod: CPTII,,, | Performed by: FAMILY MEDICINE

## 2023-03-27 PROCEDURE — 3074F PR MOST RECENT SYSTOLIC BLOOD PRESSURE < 130 MM HG: ICD-10-PCS | Mod: CPTII,,, | Performed by: FAMILY MEDICINE

## 2023-03-27 PROCEDURE — 99214 PR OFFICE/OUTPT VISIT, EST, LEVL IV, 30-39 MIN: ICD-10-PCS | Mod: S$PBB,,, | Performed by: FAMILY MEDICINE

## 2023-03-27 PROCEDURE — 1160F PR REVIEW ALL MEDS BY PRESCRIBER/CLIN PHARMACIST DOCUMENTED: ICD-10-PCS | Mod: CPTII,,, | Performed by: FAMILY MEDICINE

## 2023-03-27 PROCEDURE — 3008F BODY MASS INDEX DOCD: CPT | Mod: CPTII,,, | Performed by: FAMILY MEDICINE

## 2023-03-27 PROCEDURE — 3078F PR MOST RECENT DIASTOLIC BLOOD PRESSURE < 80 MM HG: ICD-10-PCS | Mod: CPTII,,, | Performed by: FAMILY MEDICINE

## 2023-03-27 PROCEDURE — 1159F PR MEDICATION LIST DOCUMENTED IN MEDICAL RECORD: ICD-10-PCS | Mod: CPTII,,, | Performed by: FAMILY MEDICINE

## 2023-03-27 PROCEDURE — 3008F PR BODY MASS INDEX (BMI) DOCUMENTED: ICD-10-PCS | Mod: CPTII,,, | Performed by: FAMILY MEDICINE

## 2023-03-27 PROCEDURE — 3078F DIAST BP <80 MM HG: CPT | Mod: CPTII,,, | Performed by: FAMILY MEDICINE

## 2023-03-27 PROCEDURE — 3074F SYST BP LT 130 MM HG: CPT | Mod: CPTII,,, | Performed by: FAMILY MEDICINE

## 2023-03-27 PROCEDURE — 1160F RVW MEDS BY RX/DR IN RCRD: CPT | Mod: CPTII,,, | Performed by: FAMILY MEDICINE

## 2023-03-27 PROCEDURE — 99999 PR PBB SHADOW E&M-EST. PATIENT-LVL III: CPT | Mod: PBBFAC,,, | Performed by: FAMILY MEDICINE

## 2023-03-27 NOTE — PROGRESS NOTES
Subjective:       Patient ID: Katelin Avina is a 42 y.o. female.    Pmhx, fam hx, soc hx, surg hx, allergies, med list reviewed    Wt Readings from Last 3 Encounters:   03/27/23 1134 87.1 kg (192 lb 0.3 oz)   02/14/23 1000 90.4 kg (199 lb 4.7 oz)   01/16/23 1321 93 kg (205 lb)    Pt has lost 45 lb since last summer    Chief Complaint: metabolic syndrome, weight gain.     Last goals water, walking. Tolerating medication (ozempic) without issues. She takes miralax prn and is helpful. Sleep is good. Appetite is stable.     Has had some hair loss. Saw Derm--form of alopecia.     Menstrual cycle was heavier than usual. Saw PCP last week. Hx of migraines.     Pt has no diabetes. On ozempic fasting sugars are improved: 90s/80s.     She may have end to medicaid this summer.     Appetite is more regulated. No hypoglycemia.    Eating much better.     GERD stable; meds changed to prn    HPI  Review of Systems   Constitutional:  Negative for activity change, appetite change, fatigue and fever.   HENT:  Negative for mouth dryness and goiter.    Eyes:  Negative for visual disturbance.   Respiratory:  Negative for apnea, cough, chest tightness and shortness of breath.    Cardiovascular:  Negative for chest pain, palpitations and leg swelling.   Gastrointestinal:  Negative for abdominal pain, constipation and diarrhea.   Endocrine: Negative for cold intolerance, heat intolerance, polydipsia, polyphagia and polyuria.   Genitourinary:  Negative for frequency and menstrual problem.   Musculoskeletal:  Negative for arthralgias and myalgias.   Integumentary:  Negative for color change and rash.   Psychiatric/Behavioral:  Negative for sleep disturbance. The patient is not nervous/anxious.        Objective:      Physical Exam  Vitals and nursing note reviewed.   Constitutional:       General: She is not in acute distress.  HENT:      Head: Normocephalic and atraumatic.      Mouth/Throat:      Pharynx: Oropharynx is clear.   Eyes:       General: No scleral icterus.     Pupils: Pupils are equal, round, and reactive to light.   Neck:      Comments: No TM  Cardiovascular:      Rate and Rhythm: Normal rate and regular rhythm.      Pulses: Normal pulses.      Heart sounds: Normal heart sounds. No murmur heard.    No friction rub. No gallop.   Pulmonary:      Effort: Pulmonary effort is normal. No respiratory distress.      Breath sounds: Normal breath sounds. No wheezing, rhonchi or rales.   Abdominal:      General: Bowel sounds are normal. There is no distension.      Palpations: Abdomen is soft.      Tenderness: There is no abdominal tenderness.   Musculoskeletal:         General: No swelling.      Cervical back: Normal range of motion and neck supple. No tenderness.   Lymphadenopathy:      Cervical: No cervical adenopathy.   Skin:     General: Skin is warm.      Capillary Refill: Capillary refill takes less than 2 seconds.      Findings: No erythema or rash.   Neurological:      Mental Status: She is alert and oriented to person, place, and time.   Psychiatric:         Mood and Affect: Mood normal.         Behavior: Behavior normal.       Assessment:         ICD-10-CM ICD-9-CM   1. Metabolic syndrome  E88.81 277.7   2. Hair loss  L65.9 704.00   3. Class 1 obesity due to excess calories with serious comorbidity and body mass index (BMI) of 34.0 to 34.9 in adult  E66.09 278.00    Z68.34 V85.34   4. Impaired fasting glucose  R73.01 790.21   5. Gastroesophageal reflux disease without esophagitis  K21.9 530.81             Plan:       Metabolic syndrome  Comments:  continue weight loss/lifestyle changes  Refill ozempic/sent in   also on ozempic--tolerating     Hair loss  Comments:  vit d; pt to consider nutrafol and     Class 1 obesity due to excess calories with serious comorbidity and body mass index (BMI) of 34.0 to 34.9 in adult  Comments:  continue lifestyle interventions  Going to [employer] this summer    Impaired fasting glucose  Doing well  currently        GERD: stable; on protonix prn    Doing well with quality of life since hiral loss    Dw pt she is not diabetic therefore not a candidate for mounjaro  She has lsot > 45 lb and has done really well  Continue exercise  Pt has birth control  NO issues

## 2023-05-15 ENCOUNTER — PATIENT MESSAGE (OUTPATIENT)
Dept: PRIMARY CARE CLINIC | Facility: CLINIC | Age: 43
End: 2023-05-15
Payer: MEDICAID

## 2023-05-16 ENCOUNTER — PATIENT MESSAGE (OUTPATIENT)
Dept: PRIMARY CARE CLINIC | Facility: CLINIC | Age: 43
End: 2023-05-16
Payer: MEDICAID

## 2023-06-27 ENCOUNTER — PATIENT MESSAGE (OUTPATIENT)
Dept: PRIMARY CARE CLINIC | Facility: CLINIC | Age: 43
End: 2023-06-27
Payer: MEDICAID

## 2023-07-17 ENCOUNTER — PATIENT MESSAGE (OUTPATIENT)
Dept: PRIMARY CARE CLINIC | Facility: CLINIC | Age: 43
End: 2023-07-17
Payer: COMMERCIAL

## 2023-07-17 DIAGNOSIS — E66.09 CLASS 1 OBESITY DUE TO EXCESS CALORIES WITH SERIOUS COMORBIDITY AND BODY MASS INDEX (BMI) OF 34.0 TO 34.9 IN ADULT: ICD-10-CM

## 2023-07-17 DIAGNOSIS — E88.819 INSULIN RESISTANCE: ICD-10-CM

## 2023-07-17 DIAGNOSIS — Z83.3 FAMILY HISTORY OF DIABETES MELLITUS: Chronic | ICD-10-CM

## 2023-07-17 DIAGNOSIS — E88.810 METABOLIC SYNDROME: ICD-10-CM

## 2023-07-19 ENCOUNTER — PATIENT MESSAGE (OUTPATIENT)
Dept: PRIMARY CARE CLINIC | Facility: CLINIC | Age: 43
End: 2023-07-19
Payer: COMMERCIAL

## 2023-07-19 DIAGNOSIS — E88.810 METABOLIC SYNDROME: ICD-10-CM

## 2023-07-19 DIAGNOSIS — E88.819 INSULIN RESISTANCE: ICD-10-CM

## 2023-07-19 DIAGNOSIS — Z83.3 FAMILY HISTORY OF DIABETES MELLITUS: Chronic | ICD-10-CM

## 2023-07-19 DIAGNOSIS — E66.09 CLASS 1 OBESITY DUE TO EXCESS CALORIES WITH SERIOUS COMORBIDITY AND BODY MASS INDEX (BMI) OF 34.0 TO 34.9 IN ADULT: ICD-10-CM

## 2023-07-23 ENCOUNTER — PATIENT MESSAGE (OUTPATIENT)
Dept: PRIMARY CARE CLINIC | Facility: CLINIC | Age: 43
End: 2023-07-23
Payer: COMMERCIAL

## 2023-07-23 DIAGNOSIS — E88.819 INSULIN RESISTANCE: ICD-10-CM

## 2023-07-23 DIAGNOSIS — E66.09 CLASS 1 OBESITY DUE TO EXCESS CALORIES WITH SERIOUS COMORBIDITY AND BODY MASS INDEX (BMI) OF 34.0 TO 34.9 IN ADULT: ICD-10-CM

## 2023-07-23 DIAGNOSIS — E88.810 METABOLIC SYNDROME: ICD-10-CM

## 2023-07-23 DIAGNOSIS — Z83.3 FAMILY HISTORY OF DIABETES MELLITUS: Chronic | ICD-10-CM

## 2023-07-25 ENCOUNTER — PATIENT MESSAGE (OUTPATIENT)
Dept: PRIMARY CARE CLINIC | Facility: CLINIC | Age: 43
End: 2023-07-25
Payer: COMMERCIAL

## 2023-07-25 DIAGNOSIS — E66.09 CLASS 1 OBESITY DUE TO EXCESS CALORIES WITH SERIOUS COMORBIDITY AND BODY MASS INDEX (BMI) OF 34.0 TO 34.9 IN ADULT: ICD-10-CM

## 2023-07-25 DIAGNOSIS — E88.810 METABOLIC SYNDROME: ICD-10-CM

## 2023-07-25 DIAGNOSIS — E88.819 INSULIN RESISTANCE: ICD-10-CM

## 2023-07-25 DIAGNOSIS — Z83.3 FAMILY HISTORY OF DIABETES MELLITUS: Chronic | ICD-10-CM

## 2023-07-28 ENCOUNTER — TELEPHONE (OUTPATIENT)
Dept: PRIMARY CARE CLINIC | Facility: CLINIC | Age: 43
End: 2023-07-28
Payer: COMMERCIAL

## 2023-07-28 ENCOUNTER — PATIENT MESSAGE (OUTPATIENT)
Dept: PRIMARY CARE CLINIC | Facility: CLINIC | Age: 43
End: 2023-07-28
Payer: COMMERCIAL

## 2023-07-28 NOTE — TELEPHONE ENCOUNTER
Spoke with patient and she stated that after talking to [facility], she doesn't need a prior authorization for either the Ozempic or the Mounjaro and wants to know what Dr. Rosario thinks about doing the Mounjaro.